# Patient Record
Sex: FEMALE | Race: WHITE | NOT HISPANIC OR LATINO | Employment: PART TIME | URBAN - METROPOLITAN AREA
[De-identification: names, ages, dates, MRNs, and addresses within clinical notes are randomized per-mention and may not be internally consistent; named-entity substitution may affect disease eponyms.]

---

## 2017-06-14 ENCOUNTER — PROCEDURE VISIT (OUTPATIENT)
Dept: OBSTETRICS AND GYNECOLOGY | Facility: CLINIC | Age: 52
End: 2017-06-14

## 2017-06-14 ENCOUNTER — OFFICE VISIT (OUTPATIENT)
Dept: OBSTETRICS AND GYNECOLOGY | Facility: CLINIC | Age: 52
End: 2017-06-14

## 2017-06-14 ENCOUNTER — APPOINTMENT (OUTPATIENT)
Dept: WOMENS IMAGING | Facility: HOSPITAL | Age: 52
End: 2017-06-14

## 2017-06-14 VITALS
HEIGHT: 64 IN | WEIGHT: 146 LBS | DIASTOLIC BLOOD PRESSURE: 82 MMHG | SYSTOLIC BLOOD PRESSURE: 129 MMHG | BODY MASS INDEX: 24.92 KG/M2 | HEART RATE: 83 BPM

## 2017-06-14 DIAGNOSIS — Z01.419 PAP SMEAR, AS PART OF ROUTINE GYNECOLOGICAL EXAMINATION: Primary | ICD-10-CM

## 2017-06-14 DIAGNOSIS — N76.0 ACUTE VAGINITIS: ICD-10-CM

## 2017-06-14 DIAGNOSIS — Z01.419 ENCOUNTER FOR GYNECOLOGICAL EXAMINATION WITHOUT ABNORMAL FINDING: ICD-10-CM

## 2017-06-14 DIAGNOSIS — Z12.11 COLON CANCER SCREENING: ICD-10-CM

## 2017-06-14 DIAGNOSIS — Z12.31 VISIT FOR SCREENING MAMMOGRAM: Primary | ICD-10-CM

## 2017-06-14 LAB — HEMOCCULT STL QL IA: NEGATIVE

## 2017-06-14 PROCEDURE — 99396 PREV VISIT EST AGE 40-64: CPT | Performed by: OBSTETRICS & GYNECOLOGY

## 2017-06-14 PROCEDURE — 82274 ASSAY TEST FOR BLOOD FECAL: CPT | Performed by: OBSTETRICS & GYNECOLOGY

## 2017-06-14 PROCEDURE — 77067 SCR MAMMO BI INCL CAD: CPT | Performed by: RADIOLOGY

## 2017-06-14 RX ORDER — CALCIPOTRIENE 50 UG/G
CREAM TOPICAL
COMMUNITY
Start: 2017-03-15

## 2017-06-14 RX ORDER — ATORVASTATIN CALCIUM 20 MG/1
TABLET, FILM COATED ORAL
COMMUNITY
Start: 2017-03-27

## 2017-06-14 RX ORDER — BETAMETHASONE DIPROPIONATE 0.05 %
OINTMENT (GRAM) TOPICAL
COMMUNITY
Start: 2017-04-07

## 2017-06-14 RX ORDER — OMEPRAZOLE 20 MG/1
20 CAPSULE, DELAYED RELEASE ORAL DAILY
COMMUNITY

## 2017-06-14 NOTE — PROGRESS NOTES
"Subjective   Anh Laura is a 52 y.o. female annual exam. C/o vaginal itching and dryness.    History of Present Illness    Patient is a 52 y.o. for annual exam. Patient complains of mild vaginal itching and dryness for the past year.     Health Maintenance   Topic Date Due   • TDAP/TD VACCINES (1 - Tdap) 03/12/1983   • HEPATITIS C SCREENING  06/14/2017   • LIPID PANEL  06/14/2017   • PAP SMEAR  06/14/2017   • INFLUENZA VACCINE  08/01/2017   • MAMMOGRAM  06/14/2018   • COLONOSCOPY  06/14/2025       The following portions of the patient's history were reviewed and updated as appropriate: allergies, current medications, past family history, past medical history, past social history, past surgical history and problem list.    Review of Systems   Genitourinary: Negative for menstrual problem.        Vaginal itching   All other systems reviewed and are negative.      Vitals:    06/14/17 0933   BP: 129/82   Pulse: 83   Weight: 146 lb (66.2 kg)   Height: 64\" (162.6 cm)       Objective   Physical Exam   Constitutional: She is oriented to person, place, and time. She appears well-developed and well-nourished.   HENT:   Head: Normocephalic and atraumatic.   Eyes: Conjunctivae and EOM are normal.   Neck: Normal range of motion. Neck supple. No thyromegaly present.   Cardiovascular: Normal rate, regular rhythm and normal heart sounds.    Pulmonary/Chest: Effort normal and breath sounds normal. Right breast exhibits no mass, no nipple discharge and no tenderness. Left breast exhibits no mass, no nipple discharge and no tenderness.   Abdominal: Soft. Bowel sounds are normal. There is no hepatosplenomegaly. There is no tenderness. No hernia.   Genitourinary: Rectum normal, vagina normal and uterus normal. Rectal exam shows no external hemorrhoid. There is no rash, tenderness or lesion on the right labia. There is no rash, tenderness or lesion on the left labia. Uterus is not enlarged and not tender. Cervix exhibits no " discharge. Right adnexum displays no mass and no tenderness. Left adnexum displays no mass and no tenderness. No tenderness in the vagina. No vaginal discharge found.   Genitourinary Comments: Mild atrophy noted   Musculoskeletal: Normal range of motion. She exhibits no edema.   Lymphadenopathy:        Right: No inguinal adenopathy present.        Left: No inguinal adenopathy present.   Neurological: She is alert and oriented to person, place, and time.   Skin: Skin is warm and dry. No rash noted.   Psychiatric: She has a normal mood and affect.   Vitals reviewed.        Assessment/Plan   Anh was seen today for gynecologic exam.    Diagnoses and all orders for this visit:    Pap smear, as part of routine gynecological examination  -     IGP, Apt HPV,rfx 16 / 18,45    Acute vaginitis  -     NuSwab VG+    Encounter for gynecological examination without abnormal finding    Personal lubricant for atrophy. Offered hormone cream - patient declines. Breast health discussed. Mammogram today.           Return in about 1 year (around 6/14/2018).

## 2017-06-17 LAB
A VAGINAE DNA VAG QL NAA+PROBE: ABNORMAL SCORE
BVAB2 DNA VAG QL NAA+PROBE: ABNORMAL SCORE
C ALBICANS DNA VAG QL NAA+PROBE: NEGATIVE
C GLABRATA DNA VAG QL NAA+PROBE: NEGATIVE
C TRACH RRNA SPEC QL NAA+PROBE: NEGATIVE
MEGA1 DNA VAG QL NAA+PROBE: ABNORMAL SCORE
N GONORRHOEA RRNA SPEC QL NAA+PROBE: NEGATIVE
T VAGINALIS RRNA SPEC QL NAA+PROBE: NEGATIVE

## 2017-06-19 LAB
CYTOLOGIST CVX/VAG CYTO: NORMAL
CYTOLOGY CVX/VAG DOC THIN PREP: NORMAL
DX ICD CODE: NORMAL
HIV 1 & 2 AB SER-IMP: NORMAL
HPV I/H RISK 4 DNA CVX QL PROBE+SIG AMP: NEGATIVE
Lab: NORMAL
OTHER STN SPEC: NORMAL
PATH REPORT.FINAL DX SPEC: NORMAL
STAT OF ADQ CVX/VAG CYTO-IMP: NORMAL

## 2017-06-19 RX ORDER — METRONIDAZOLE 500 MG/1
500 TABLET ORAL 2 TIMES DAILY
Qty: 14 TABLET | Refills: 0 | Status: SHIPPED | OUTPATIENT
Start: 2017-06-19 | End: 2019-04-30

## 2017-06-20 ENCOUNTER — TELEPHONE (OUTPATIENT)
Dept: OBSTETRICS AND GYNECOLOGY | Facility: CLINIC | Age: 52
End: 2017-06-20

## 2017-06-20 NOTE — TELEPHONE ENCOUNTER
----- Message from Sherrell Ye MA sent at 6/19/2017 11:35 AM EDT -----  June gr pt/bandar  ----- Message -----     From: Linda Thompson MD     Sent: 6/19/2017   9:16 AM       To: Sherrell Ye MA    Please call the patient regarding her abnormal result. Flagyl was sent for bacterial vaginosis.

## 2019-04-30 ENCOUNTER — PROCEDURE VISIT (OUTPATIENT)
Dept: OBSTETRICS AND GYNECOLOGY | Facility: CLINIC | Age: 54
End: 2019-04-30

## 2019-04-30 ENCOUNTER — OFFICE VISIT (OUTPATIENT)
Dept: OBSTETRICS AND GYNECOLOGY | Facility: CLINIC | Age: 54
End: 2019-04-30

## 2019-04-30 ENCOUNTER — APPOINTMENT (OUTPATIENT)
Dept: WOMENS IMAGING | Facility: HOSPITAL | Age: 54
End: 2019-04-30

## 2019-04-30 VITALS
HEART RATE: 98 BPM | HEIGHT: 64 IN | BODY MASS INDEX: 26.63 KG/M2 | SYSTOLIC BLOOD PRESSURE: 122 MMHG | WEIGHT: 156 LBS | DIASTOLIC BLOOD PRESSURE: 82 MMHG

## 2019-04-30 DIAGNOSIS — N76.3 CHRONIC VULVITIS: ICD-10-CM

## 2019-04-30 DIAGNOSIS — Z01.419 VISIT FOR GYNECOLOGIC EXAMINATION: Primary | ICD-10-CM

## 2019-04-30 DIAGNOSIS — Z12.31 VISIT FOR SCREENING MAMMOGRAM: Primary | ICD-10-CM

## 2019-04-30 DIAGNOSIS — Z12.11 COLON CANCER SCREENING: ICD-10-CM

## 2019-04-30 LAB
DEVELOPER EXPIRATION DATE: NORMAL
DEVELOPER LOT NUMBER: NORMAL
EXPIRATION DATE: NORMAL
FECAL OCCULT BLOOD SCREEN, POC: NEGATIVE
Lab: NORMAL
NEGATIVE CONTROL: NEGATIVE
POSITIVE CONTROL: POSITIVE

## 2019-04-30 PROCEDURE — 82274 ASSAY TEST FOR BLOOD FECAL: CPT | Performed by: OBSTETRICS & GYNECOLOGY

## 2019-04-30 PROCEDURE — 77067 SCR MAMMO BI INCL CAD: CPT | Performed by: RADIOLOGY

## 2019-04-30 PROCEDURE — 56605 BIOPSY OF VULVA/PERINEUM: CPT | Performed by: OBSTETRICS & GYNECOLOGY

## 2019-04-30 PROCEDURE — 99396 PREV VISIT EST AGE 40-64: CPT | Performed by: OBSTETRICS & GYNECOLOGY

## 2019-04-30 PROCEDURE — 77067 SCR MAMMO BI INCL CAD: CPT | Performed by: OBSTETRICS & GYNECOLOGY

## 2019-04-30 NOTE — PROGRESS NOTES
"[facility]  [address], [address]  [place], [place] [address]  Phone: [phone] / Fax:  [phone]      2019    [address] [place] [address] [place] [id number]    Brendan Jean MD    Chief Complaint   Patient presents with   • Gynecologic Exam     Annual Exam, last pap  6-14-17 Nl Hpv (-), mammogram today, colonoscopy 3 years nl per patient.       Anh Laura is here for annual gynecologic exam.  HPI - Patient is a 54 year old female who had last pap/annual 2 years ago.  Colonoscopy up to date and normal.  Mammogram was done today.  Patient reports chronic vulvar itching and irritation.  She has a history of psoriasis and states she has tried to put her topical medications on the areas that bother her with limited success.  She has not had a work up.    Past Medical History:   Diagnosis Date   • Hyperlipidemia        Past Surgical History:   Procedure Laterality Date   • SMALL INTESTINE SURGERY         No Known Allergies    Social History     Socioeconomic History   • Marital status: Single     Spouse name: Not on file   • Number of children: Not on file   • Years of education: Not on file   • Highest education level: Not on file   Tobacco Use   • Smoking status: Never Smoker   • Smokeless tobacco: Never Used   Substance and Sexual Activity   • Alcohol use: Yes     Comment: rarely   • Drug use: No   • Sexual activity: Yes     Partners: Male     Birth control/protection: Post-menopausal       Family History   Problem Relation Age of Onset   • Heart disease Father        No LMP recorded. Patient is postmenopausal.    OB History      Para Term  AB Living    0 0 0 0 0 0    SAB TAB Ectopic Molar Multiple Live Births    0 0 0   0            Vitals:    19 1419   BP: 122/82   Pulse: 98   Weight: 70.8 kg (156 lb)   Height: 162.6 cm (64\")       Physical Exam   Constitutional: She appears well-developed and well-nourished.   Genitourinary: Rectum normal, vagina normal and " uterus normal. Pelvic exam was performed with patient supine. No labial fusion.   There is rash on the right labia. There is no tenderness, injury or Bartholin's cyst on the right labia.   There is rash on the left labia. There is no tenderness, injury or Bartholin's cyst on the left labia. Right adnexum does not display tenderness and does not display fullness. Left adnexum does not display tenderness and does not display fullness. Cervix does not exhibit motion tenderness or lesion. Rectal exam shows no external hemorrhoid, no tenderness and guaiac negative stool.   HENT:   Right Ear: External ear normal.   Left Ear: External ear normal.   Nose: Nose normal.   Eyes: Conjunctivae are normal.   Neck: Normal range of motion. Neck supple. No thyromegaly present.   Cardiovascular: Normal rate, regular rhythm and normal heart sounds.   Pulmonary/Chest: Effort normal. No stridor. She has no wheezes. Right breast exhibits no mass and no nipple discharge. Left breast exhibits no mass and no nipple discharge.   Abdominal: There is no tenderness. There is no guarding.   Musculoskeletal: Normal range of motion. She exhibits no edema.   Neurological: She is alert. Coordination normal.   Skin: Skin is warm and dry.   Psychiatric: She has a normal mood and affect. Her behavior is normal. Judgment and thought content normal.   Vitals reviewed.    PROCEDURE: Biopsy of vulva. Lesion displays intense itching and plaque-like thickening.  Location: vulva  Size: 3 mm punch biopsy    Informed Consent: The indications, risks, benefits and alternatives to the procedure, including no procedure, were discussed in detail.  Risks of the procedure were described and verbal consent was obtained.    The surgical site was prepped with betadine.  Sterile technique was used throughout the procedure.   Anesthesia was obtained using 1% lidocaine.      A biopsy was obtained from the lesion using punch technique of the right introitus.  The surgical  site was closed using silver nitrate.    The procedure was well tolerated without complications.  Blood loss was minimal. The specimen was submitted to pathology. The patient was educated about signs of infection, and wound care instructions were reviewed.  The patient will be contacted in 7-10 days with the biopsy results and a follow up plan will be discussed at that time.    Anh was seen today for gynecologic exam.    Diagnoses and all orders for this visit:    Visit for gynecologic examination        -     Discussed importance of routine screening and breast awareness.  Mammogram and colonoscopy up to date.  Chronic vulvitis  -     Reference Histopathology  -     Biopsy Vulva  -     Await results.    Colon cancer screening        -     FOBT negative.  Screening up to date.      Louie Chiang MD

## 2019-05-03 LAB
DX ICD CODE: NORMAL
PATH REPORT.FINAL DX SPEC: NORMAL
PATH REPORT.GROSS SPEC: NORMAL
PATH REPORT.SITE OF ORIGIN SPEC: NORMAL
PATHOLOGIST NAME: NORMAL
PAYMENT PROCEDURE: NORMAL

## 2019-05-06 ENCOUNTER — TELEPHONE (OUTPATIENT)
Dept: OBSTETRICS AND GYNECOLOGY | Facility: CLINIC | Age: 54
End: 2019-05-06

## 2019-05-06 RX ORDER — CLOBETASOL PROPIONATE 0.5 MG/G
CREAM TOPICAL 2 TIMES DAILY
Qty: 60 G | Refills: 1 | Status: SHIPPED | OUTPATIENT
Start: 2019-05-06

## 2025-03-17 NOTE — PROGRESS NOTES
Chief Complaint  Chief Complaint   Patient presents with    Establish Care     New Patient     Gout     Gout causing ankle pain     Hypertension        Subjective          Anh Laura is here today to establish care. The following problems were discussed:     Family history: Father- HTN. Brother- HTN, CVA, blood clot     Social history: Denies smoking, drinks 5 beers 3 times a week, denies illegal drug use.     HLD- She tries to watch what she eats. She was previously on HLD medication, but has not been on any for a while.     GERD- She takes omeprazole. Controlled on medication.     Psoriasis- She puts on cream everyday.     Overweight- She exercises 5 times a week. She tries to eat healthy, not enough healthy food.     Left foot pain- She reports this started last Monday. She denies any injuries. Her brother and father has gout. She reports Sunday she had trouble walking on it. She has been taking two aleve and tylenol every 5 hours.     She is from this area   Previous PCP was Laurence Galeas   Marital status- not   Children- No  Works as [employer]   Exercise- regularly 5 times weekly  Diet- Not eating enough healthy food         Review of Systems   Constitutional:  Negative for chills and fever.   HENT:  Negative for congestion.    Respiratory:  Negative for shortness of breath.    Cardiovascular:  Negative for chest pain.   Gastrointestinal:  Positive for nausea. Negative for abdominal pain and vomiting.   Genitourinary:  Negative for difficulty urinating.   Musculoskeletal:  Negative for myalgias.        Left foot pain    Skin: Negative.    Neurological:  Negative for dizziness, light-headedness and headache.   Psychiatric/Behavioral:  Negative for self-injury, suicidal ideas and depressed mood. The patient is not nervous/anxious.         Objective   Vital Signs:   Vitals:    03/19/25 1320   BP: 122/75   Pulse: 91   Resp: 14   Temp: 98 °F (36.7 °C)   SpO2: 100%      Estimated body mass index  "is 29.7 kg/m² as calculated from the following:    Height as of this encounter: 162.6 cm (64\").    Weight as of this encounter: 78.5 kg (173 lb).    BMI is >= 25 and <30. (Overweight) The following options were offered after discussion;: exercise counseling/recommendations and nutrition counseling/recommendations            Patient screened negative for depression based on a PHQ-9 score of 0 on 3/19/2025 . Follow-up recommendations include: PCP managing depression.        Physical Exam  Vitals reviewed.   Constitutional:       Appearance: Normal appearance.   HENT:      Head: Normocephalic and atraumatic.      Nose: Nose normal.      Mouth/Throat:      Mouth: Mucous membranes are moist.      Pharynx: Oropharynx is clear.   Eyes:      Extraocular Movements: Extraocular movements intact.      Conjunctiva/sclera: Conjunctivae normal.      Pupils: Pupils are equal, round, and reactive to light.   Cardiovascular:      Rate and Rhythm: Normal rate and regular rhythm.      Pulses: Normal pulses.      Heart sounds: Normal heart sounds.      Comments: S1, S2 audible  Pulmonary:      Effort: Pulmonary effort is normal.      Breath sounds: Normal breath sounds.      Comments: On room air   Abdominal:      General: Abdomen is flat. Bowel sounds are normal.      Palpations: Abdomen is soft.   Musculoskeletal:         General: Normal range of motion.      Cervical back: Normal range of motion.   Skin:     General: Skin is warm and dry.   Neurological:      General: No focal deficit present.      Mental Status: She is alert and oriented to person, place, and time. Mental status is at baseline.   Psychiatric:         Mood and Affect: Mood normal.         Behavior: Behavior normal.         Thought Content: Thought content normal.         Judgment: Judgment normal.                Physical Exam   Result Review :             Procedures       Assessment and Plan     Diagnoses and all orders for this visit:    1. Encounter to establish " care (Primary)  Assessment & Plan:  She is from this area   Previous PCP was Laurence Galeas   Marital status- not   Children- No  Works as [employer]   Exercise- regularly 5 times weekly  Diet- Not eating enough healthy food    Family history: Father- HTN. Brother- HTN, CVA, blood clot     Social history: Denies smoking, drinks 5 beers 3 times a week, denies illegal drug use.       2. Hyperlipidemia, unspecified hyperlipidemia type  Assessment & Plan:   HLD- She tries to watch what she eats. She was previously on HLD medication, but has not been on any for a while.     Check lipid panel     Orders:  -     Lipid panel    3. Gastroesophageal reflux disease without esophagitis  Assessment & Plan:  GERD- She takes omeprazole. Controlled on medication.       4. Psoriasis  Assessment & Plan:  Psoriasis- She puts on cream everyday.       5. Overweight (BMI 25.0-29.9)  Assessment & Plan:  Patient's (Body mass index is 29.7 kg/m².)       Overweight- She exercises 5 times a week. She tries to eat healthy, not enough healthy food.     Encourage healthy diet and exercise      6. Colon cancer screening  -     Ambulatory Referral For Screening Colonoscopy    7. Left foot pain  Assessment & Plan:    Left foot pain- She reports this started last Monday. She denies any injuries. Her brother and father has gout. She reports Sunday she had trouble walking on it. She has been taking two aleve and tylenol every 5 hours.     Gout-A void vitamin C, cherries/extract, avoid alcohol and red meat. Increase exercise and work on eating a well-balanced diet.  Limit white foods (pasta, bread, rice, potatoes), saturated fats, sugary drinks, and dark meats.        Check uric acid and CBC    Orders:  -     Uric acid  -     CBC & Differential              Follow Up   Return for Annual physical.   Patient was given instructions and counseling regarding her condition or for health maintenance advice. Please see specific information pulled  into the AVS if appropriate.

## 2025-03-19 ENCOUNTER — OFFICE VISIT (OUTPATIENT)
Dept: FAMILY MEDICINE CLINIC | Facility: CLINIC | Age: 60
End: 2025-03-19
Payer: COMMERCIAL

## 2025-03-19 VITALS
RESPIRATION RATE: 14 BRPM | HEART RATE: 91 BPM | SYSTOLIC BLOOD PRESSURE: 122 MMHG | BODY MASS INDEX: 29.53 KG/M2 | OXYGEN SATURATION: 100 % | HEIGHT: 64 IN | TEMPERATURE: 98 F | WEIGHT: 173 LBS | DIASTOLIC BLOOD PRESSURE: 75 MMHG

## 2025-03-19 DIAGNOSIS — E78.5 HYPERLIPIDEMIA, UNSPECIFIED HYPERLIPIDEMIA TYPE: ICD-10-CM

## 2025-03-19 DIAGNOSIS — Z12.11 COLON CANCER SCREENING: ICD-10-CM

## 2025-03-19 DIAGNOSIS — M79.672 LEFT FOOT PAIN: ICD-10-CM

## 2025-03-19 DIAGNOSIS — Z76.89 ENCOUNTER TO ESTABLISH CARE: Primary | ICD-10-CM

## 2025-03-19 DIAGNOSIS — E66.3 OVERWEIGHT (BMI 25.0-29.9): ICD-10-CM

## 2025-03-19 DIAGNOSIS — K21.9 GASTROESOPHAGEAL REFLUX DISEASE WITHOUT ESOPHAGITIS: ICD-10-CM

## 2025-03-19 DIAGNOSIS — L40.9 PSORIASIS: ICD-10-CM

## 2025-03-19 PROBLEM — Z01.419 ENCOUNTER FOR GYNECOLOGICAL EXAMINATION WITHOUT ABNORMAL FINDING: Status: RESOLVED | Noted: 2017-06-14 | Resolved: 2025-03-19

## 2025-03-19 NOTE — ASSESSMENT & PLAN NOTE
She is from this area   Previous PCP was Laurence Galeas   Marital status- not   Children- No  Works as [employer]   Exercise- regularly 5 times weekly  Diet- Not eating enough healthy food    Family history: Father- HTN. Brother- HTN, CVA, blood clot     Social history: Denies smoking, drinks 5 beers 3 times a week, denies illegal drug use.

## 2025-03-19 NOTE — PATIENT INSTRUCTIONS
Gout-A void vitamin C, cherries/extract, avoid alcohol and red meat. Increase exercise and work on eating a well-balanced diet.  Limit white foods (pasta, bread, rice, potatoes), saturated fats, sugary drinks, and dark meats.    Check labs   Referral to gastroenterology

## 2025-03-19 NOTE — ASSESSMENT & PLAN NOTE
HLD- She tries to watch what she eats. She was previously on HLD medication, but has not been on any for a while.     Check lipid panel

## 2025-03-19 NOTE — ASSESSMENT & PLAN NOTE
Patient's (Body mass index is 29.7 kg/m².)       Overweight- She exercises 5 times a week. She tries to eat healthy, not enough healthy food.     Encourage healthy diet and exercise

## 2025-03-19 NOTE — ASSESSMENT & PLAN NOTE
Left foot pain- She reports this started last Monday. She denies any injuries. Her brother and father has gout. She reports Sunday she had trouble walking on it. She has been taking two aleve and tylenol every 5 hours.     Gout-A void vitamin C, cherries/extract, avoid alcohol and red meat. Increase exercise and work on eating a well-balanced diet.  Limit white foods (pasta, bread, rice, potatoes), saturated fats, sugary drinks, and dark meats.        Check uric acid and CBC

## 2025-03-19 NOTE — PROGRESS NOTES
Venipuncture performed in L ARM by RT Patrice  with good hemostasis. Patient tolerated well. 03/19/25 Cadence Beth, RT

## 2025-03-25 ENCOUNTER — CLINICAL SUPPORT (OUTPATIENT)
Dept: FAMILY MEDICINE CLINIC | Facility: CLINIC | Age: 60
End: 2025-03-25
Payer: COMMERCIAL

## 2025-03-25 DIAGNOSIS — M79.672 LEFT FOOT PAIN: ICD-10-CM

## 2025-03-25 DIAGNOSIS — E78.5 HYPERLIPIDEMIA, UNSPECIFIED HYPERLIPIDEMIA TYPE: ICD-10-CM

## 2025-03-25 DIAGNOSIS — Z76.89 ENCOUNTER TO ESTABLISH CARE: ICD-10-CM

## 2025-03-25 DIAGNOSIS — L40.9 PSORIASIS: ICD-10-CM

## 2025-03-25 PROCEDURE — 80061 LIPID PANEL: CPT | Performed by: NURSE PRACTITIONER

## 2025-03-25 PROCEDURE — 85025 COMPLETE CBC W/AUTO DIFF WBC: CPT | Performed by: NURSE PRACTITIONER

## 2025-03-25 PROCEDURE — 84550 ASSAY OF BLOOD/URIC ACID: CPT | Performed by: NURSE PRACTITIONER

## 2025-03-25 NOTE — PROGRESS NOTES
Venipuncture performed in right arm by Vidya Loyola CMA  with good hemostasis. Patient tolerated well. 03/25/25 Vidya Loyola CMA

## 2025-03-26 PROBLEM — M10.9 GOUT: Status: ACTIVE | Noted: 2025-03-26

## 2025-03-26 LAB
BASOPHILS # BLD AUTO: 0.05 10*3/MM3 (ref 0–0.2)
BASOPHILS NFR BLD AUTO: 0.5 % (ref 0–1.5)
CHOLEST SERPL-MCNC: 256 MG/DL (ref 0–200)
DEPRECATED RDW RBC AUTO: 37.2 FL (ref 37–54)
EOSINOPHIL # BLD AUTO: 0.15 10*3/MM3 (ref 0–0.4)
EOSINOPHIL NFR BLD AUTO: 1.4 % (ref 0.3–6.2)
ERYTHROCYTE [DISTWIDTH] IN BLOOD BY AUTOMATED COUNT: 11.8 % (ref 12.3–15.4)
HCT VFR BLD AUTO: 38.6 % (ref 34–46.6)
HDLC SERPL-MCNC: 57 MG/DL (ref 40–60)
HGB BLD-MCNC: 13.1 G/DL (ref 12–15.9)
IMM GRANULOCYTES # BLD AUTO: 0.03 10*3/MM3 (ref 0–0.05)
IMM GRANULOCYTES NFR BLD AUTO: 0.3 % (ref 0–0.5)
LDLC SERPL CALC-MCNC: 160 MG/DL (ref 0–100)
LDLC/HDLC SERPL: 2.75 {RATIO}
LYMPHOCYTES # BLD AUTO: 3.98 10*3/MM3 (ref 0.7–3.1)
LYMPHOCYTES NFR BLD AUTO: 36.4 % (ref 19.6–45.3)
MCH RBC QN AUTO: 29.4 PG (ref 26.6–33)
MCHC RBC AUTO-ENTMCNC: 33.9 G/DL (ref 31.5–35.7)
MCV RBC AUTO: 86.5 FL (ref 79–97)
MONOCYTES # BLD AUTO: 0.83 10*3/MM3 (ref 0.1–0.9)
MONOCYTES NFR BLD AUTO: 7.6 % (ref 5–12)
NEUTROPHILS NFR BLD AUTO: 5.89 10*3/MM3 (ref 1.7–7)
NEUTROPHILS NFR BLD AUTO: 53.8 % (ref 42.7–76)
NRBC BLD AUTO-RTO: 0 /100 WBC (ref 0–0.2)
PLATELET # BLD AUTO: 271 10*3/MM3 (ref 140–450)
PMV BLD AUTO: 10.7 FL (ref 6–12)
RBC # BLD AUTO: 4.46 10*6/MM3 (ref 3.77–5.28)
TRIGL SERPL-MCNC: 211 MG/DL (ref 0–150)
URATE SERPL-MCNC: 7.6 MG/DL (ref 2.4–5.7)
VLDLC SERPL-MCNC: 39 MG/DL (ref 5–40)
WBC NRBC COR # BLD AUTO: 10.93 10*3/MM3 (ref 3.4–10.8)

## 2025-06-09 ENCOUNTER — PATIENT MESSAGE (OUTPATIENT)
Dept: FAMILY MEDICINE CLINIC | Facility: CLINIC | Age: 60
End: 2025-06-09
Payer: COMMERCIAL

## 2025-06-11 NOTE — PROGRESS NOTES
"Chief Complaint  Chief Complaint   Patient presents with    Annual Exam        Subjective          Anh Laura is a 60-year-old female who reports to the office today for annual physical.    Annual physical-Denies any new family history. Family history: Father- HTN. Brother- HTN, CVA, blood clot Social history: Denies smoking, drinks 5 beers 3 times a week, denies illegal drug use.      HLD- She tries to watch what she eats. She is compliant on statin.     GERD- She takes omeprazole. Controlled on medication.      Psoriasis- She puts on cream as needed with flare-ups.      Overweight- She exercises 5 times a week. She tries to eat healthy.    Gout- She took the allopurinol and reports it got better.     Labs-Due   Colonoscopy-Due  Mammogram-Has scheduled for 7/3/2025   Papsmear-Has on July 3,2025       Vaccines:  Shingles-Due       Dental exam-UTD  Eye exam-UTD          Review of Systems   Constitutional:  Negative for chills and fever.   HENT:  Negative for congestion.    Eyes: Negative.    Respiratory:  Negative for shortness of breath.    Cardiovascular:  Negative for chest pain.   Gastrointestinal:  Negative for abdominal pain, nausea and vomiting.   Genitourinary:  Negative for difficulty urinating.   Musculoskeletal:  Negative for myalgias.   Skin: Negative.    Neurological:  Negative for dizziness, light-headedness and headache.   Psychiatric/Behavioral:  Negative for self-injury, suicidal ideas and depressed mood. The patient is not nervous/anxious.         Objective   Vital Signs:   Vitals:    06/17/25 1415   BP: 138/86   Pulse: 85   Temp: 98.2 °F (36.8 °C)   SpO2: 99%      Estimated body mass index is 30.02 kg/m² as calculated from the following:    Height as of this encounter: 162.6 cm (64.02\").    Weight as of this encounter: 79.4 kg (175 lb).                  Patient screened negative for depression based on a PHQ-9 score of 0  on 6/17/2025. Follow-up recommendations include: PCP managing " depression.        Physical Exam  Vitals reviewed.   Constitutional:       Appearance: Normal appearance. She is obese.   HENT:      Head: Normocephalic and atraumatic.      Right Ear: Tympanic membrane, ear canal and external ear normal.      Left Ear: Tympanic membrane, ear canal and external ear normal.      Nose: Nose normal.      Mouth/Throat:      Mouth: Mucous membranes are moist.      Pharynx: Oropharynx is clear.   Eyes:      Extraocular Movements: Extraocular movements intact.      Conjunctiva/sclera: Conjunctivae normal.      Pupils: Pupils are equal, round, and reactive to light.   Cardiovascular:      Rate and Rhythm: Normal rate and regular rhythm.      Pulses: Normal pulses.      Heart sounds: Normal heart sounds.      Comments: S1, S2 audible  Pulmonary:      Effort: Pulmonary effort is normal.      Breath sounds: Normal breath sounds.      Comments: On room air   Abdominal:      General: Abdomen is flat. Bowel sounds are normal.      Palpations: Abdomen is soft.   Musculoskeletal:         General: Normal range of motion.      Cervical back: Normal range of motion and neck supple.   Skin:     General: Skin is warm and dry.   Neurological:      General: No focal deficit present.      Mental Status: She is alert and oriented to person, place, and time. Mental status is at baseline.   Psychiatric:         Mood and Affect: Mood normal.         Behavior: Behavior normal.         Thought Content: Thought content normal.         Judgment: Judgment normal.                Physical Exam   Result Review :             Procedures       Assessment and Plan     Diagnoses and all orders for this visit:    1. Hyperlipidemia, unspecified hyperlipidemia type (Primary)  Assessment & Plan:  Check lipid panel  On statin     HLD- She tries to watch what she eats. She is compliant on statin.      2. Psoriasis  Assessment & Plan:  On clobetasol cream    Psoriasis- She puts on cream as needed with flare-ups.       3.  Gastroesophageal reflux disease without esophagitis  Assessment & Plan:  GERD- She takes omeprazole. Controlled on medication.       4. Gout, unspecified cause, unspecified chronicity, unspecified site  Assessment & Plan:  On allopurinol     Gout- She took the allopurinol and reports it got better.       5. Annual physical exam  Assessment & Plan:  Annual physical-Denies any new family history. Family history: Father- HTN. Brother- HTN, CVA, blood clot Social history: Denies smoking, drinks 5 beers 3 times a week, denies illegal drug use.     Labs-Due   Colonoscopy-Due  Mammogram-Has scheduled for 7/3/2025   Papsmear-Has on July 3,2025       Vaccines:  Shingles-Due       Dental exam-UTD  Eye exam-UTD     Encourage healthy diet and exercise   Encouraged monthly self breast exams  Check labs  Call about colonoscopy  Encourage shingles vaccine  Mammogram and papsmear scheduled already     Orders:  -     Comprehensive Metabolic Panel; Future  -     Hemoglobin A1c; Future  -     Lipid Panel; Future    6. Overweight (BMI 25.0-29.9)  Assessment & Plan:  Patient's (Body mass index is 30.02 kg/m².)     Overweight- She exercises 5 times a week. She tries to eat healthy.    Encourage healthy diet and exercise       7. Obesity (BMI 30-39.9)  Assessment & Plan:  Patient's (Body mass index is 30.02 kg/m².)        Overweight- She exercises 5 times a week. She tries to eat healthy.     Encourage healthy diet and exercise       Other orders  -     allopurinol (Zyloprim) 100 MG tablet; Take 1 tablet by mouth Daily.  Dispense: 90 tablet; Refill: 1              Follow Up   Return if symptoms worsen or fail to improve.   Patient was given instructions and counseling regarding her condition or for health maintenance advice. Please see specific information pulled into the AVS if appropriate.

## 2025-06-16 NOTE — ASSESSMENT & PLAN NOTE
Check lipid panel  On statin     HLD- She tries to watch what she eats. She is compliant on statin.

## 2025-06-17 ENCOUNTER — OFFICE VISIT (OUTPATIENT)
Dept: FAMILY MEDICINE CLINIC | Facility: CLINIC | Age: 60
End: 2025-06-17
Payer: COMMERCIAL

## 2025-06-17 VITALS
HEIGHT: 64 IN | TEMPERATURE: 98.2 F | BODY MASS INDEX: 29.88 KG/M2 | DIASTOLIC BLOOD PRESSURE: 86 MMHG | SYSTOLIC BLOOD PRESSURE: 138 MMHG | HEART RATE: 85 BPM | OXYGEN SATURATION: 99 % | WEIGHT: 175 LBS

## 2025-06-17 DIAGNOSIS — E78.5 HYPERLIPIDEMIA, UNSPECIFIED HYPERLIPIDEMIA TYPE: Primary | ICD-10-CM

## 2025-06-17 DIAGNOSIS — E66.9 OBESITY (BMI 30-39.9): ICD-10-CM

## 2025-06-17 DIAGNOSIS — L40.9 PSORIASIS: ICD-10-CM

## 2025-06-17 DIAGNOSIS — Z00.00 ANNUAL PHYSICAL EXAM: ICD-10-CM

## 2025-06-17 DIAGNOSIS — E66.3 OVERWEIGHT (BMI 25.0-29.9): ICD-10-CM

## 2025-06-17 DIAGNOSIS — M10.9 GOUT, UNSPECIFIED CAUSE, UNSPECIFIED CHRONICITY, UNSPECIFIED SITE: ICD-10-CM

## 2025-06-17 DIAGNOSIS — K21.9 GASTROESOPHAGEAL REFLUX DISEASE WITHOUT ESOPHAGITIS: ICD-10-CM

## 2025-06-17 PROBLEM — Z76.89 ENCOUNTER TO ESTABLISH CARE: Status: RESOLVED | Noted: 2025-03-19 | Resolved: 2025-06-17

## 2025-06-17 PROBLEM — M79.672 LEFT FOOT PAIN: Status: RESOLVED | Noted: 2025-03-19 | Resolved: 2025-06-17

## 2025-06-17 RX ORDER — ALLOPURINOL 100 MG/1
100 TABLET ORAL DAILY
Qty: 90 TABLET | Refills: 1 | Status: SHIPPED | OUTPATIENT
Start: 2025-06-17

## 2025-06-17 NOTE — ASSESSMENT & PLAN NOTE
Annual physical-Denies any new family history. Family history: Father- HTN. Brother- HTN, CVA, blood clot Social history: Denies smoking, drinks 5 beers 3 times a week, denies illegal drug use.     Labs-Due   Colonoscopy-Due  Mammogram-Has scheduled for 7/3/2025   Papsmear-Has on July 3,2025       Vaccines:  Shingles-Due       Dental exam-UTD  Eye exam-UTD     Encourage healthy diet and exercise   Encouraged monthly self breast exams  Check labs  Call about colonoscopy  Encourage shingles vaccine  Mammogram and papsmear scheduled already

## 2025-06-17 NOTE — ASSESSMENT & PLAN NOTE
Patient's (Body mass index is 30.02 kg/m².)        Overweight- She exercises 5 times a week. She tries to eat healthy.     Encourage healthy diet and exercise

## 2025-06-17 NOTE — PATIENT INSTRUCTIONS
Encourage healthy diet and exercise   Encouraged monthly self breast exams  Check labs  Call about colonoscopy  Encourage shingles vaccine  Mammogram and papsmear scheduled already

## 2025-07-03 ENCOUNTER — PROCEDURE VISIT (OUTPATIENT)
Dept: OBSTETRICS AND GYNECOLOGY | Facility: CLINIC | Age: 60
End: 2025-07-03
Payer: COMMERCIAL

## 2025-07-03 ENCOUNTER — TELEPHONE (OUTPATIENT)
Dept: OBSTETRICS AND GYNECOLOGY | Facility: CLINIC | Age: 60
End: 2025-07-03
Payer: COMMERCIAL

## 2025-07-03 ENCOUNTER — OFFICE VISIT (OUTPATIENT)
Dept: OBSTETRICS AND GYNECOLOGY | Facility: CLINIC | Age: 60
End: 2025-07-03
Payer: COMMERCIAL

## 2025-07-03 VITALS
BODY MASS INDEX: 29.53 KG/M2 | WEIGHT: 173 LBS | DIASTOLIC BLOOD PRESSURE: 84 MMHG | HEIGHT: 64 IN | SYSTOLIC BLOOD PRESSURE: 139 MMHG

## 2025-07-03 DIAGNOSIS — Z12.31 VISIT FOR SCREENING MAMMOGRAM: Primary | ICD-10-CM

## 2025-07-03 DIAGNOSIS — N90.4 LICHEN SCLEROSUS OF FEMALE GENITALIA: ICD-10-CM

## 2025-07-03 DIAGNOSIS — Z13.820 SCREENING FOR OSTEOPOROSIS: ICD-10-CM

## 2025-07-03 DIAGNOSIS — Z01.419 WOMEN'S ANNUAL ROUTINE GYNECOLOGICAL EXAMINATION: Primary | ICD-10-CM

## 2025-07-03 DIAGNOSIS — Z78.0 POSTMENOPAUSE: ICD-10-CM

## 2025-07-03 DIAGNOSIS — Z12.11 SCREENING FOR COLON CANCER: ICD-10-CM

## 2025-07-03 RX ORDER — CLOBETASOL PROPIONATE 0.5 MG/G
CREAM TOPICAL 2 TIMES DAILY
Qty: 60 G | Refills: 1 | Status: SHIPPED | OUTPATIENT
Start: 2025-07-03

## 2025-07-03 NOTE — PROGRESS NOTES
GYN Annual Exam     Chief Complaint   Patient presents with    Gynecologic Exam     Pt here today for AE, Mammo today      Anh Laura is a 60 y.o. female who presents for annual well woman exam. She is postmenopausal. She denies vaginal spotting or discharge. She completes SBE monthly. Hx vulvar itching, prior biopsy proven lichen sclerosus. Hx psoriasis. Vulvar itching improved with clobetasol. Reports intermittent itching. She is a pt of Dr. Chiang's.  Last seen in 2019. This is my first time meeting Anh Laura     OB History          0    Para   0    Term   0       0    AB   0    Living   0         SAB   0    IAB   0    Ectopic   0    Molar        Multiple   0    Live Births                  Mammogram: today  Dexa scan: never   Colonoscopy:   Last Pap :  NIL, HPV negative  History of abnormal Pap smear: yes, reports prior cervical procedure, she is unsure if dysplasia was present  Family history of uterine, colon or ovarian cancer: no  Family history of breast cancer: no  History of abnormal mammogram: no    Menopause:  Bleeding since? no  Vasomotor symptoms: no  HRT: no  Dyspareunia: yes, dryness. Improved with lubricants.     Past Medical History:   Diagnosis Date    Abnormal Pap smear of cervix ?    Don't remember    Colon polyp     I think they said i had some    GERD (gastroesophageal reflux disease)     Take omneprazole every to every other day    Gout     Hyperlipidemia     Varicella     I wasa kid       Past Surgical History:   Procedure Laterality Date    COLONOSCOPY      SMALL INTESTINE SURGERY      WISDOM TOOTH EXTRACTION      I was 18         Current Outpatient Medications:     allopurinol (Zyloprim) 100 MG tablet, Take 1 tablet by mouth Daily., Disp: 90 tablet, Rfl: 1    atorvastatin (Lipitor) 20 MG tablet, Take 1 tablet by mouth Daily., Disp: 90 tablet, Rfl: 1    calcipotriene (DOVONEX) 0.005 % cream, , Disp: , Rfl:     clobetasol propionate  "(Temovate) 0.05 % cream, Apply  topically to the appropriate area as directed 2 (Two) Times a Day., Disp: 60 g, Rfl: 1    multivitamin with minerals (MULTIVITAMIN ADULTS PO), Take 1 tablet by mouth Daily. URIC ACID, Disp: , Rfl:     omeprazole (priLOSEC) 20 MG capsule, Take 1 capsule by mouth Daily., Disp: , Rfl:     Probiotic Product (PROBIOTIC BLEND PO), Take 1 tablet by mouth Daily., Disp: , Rfl:     No Known Allergies    Social History     Tobacco Use    Smoking status: Former     Current packs/day: 0.00     Types: Cigarettes     Start date: 1985     Quit date: 1997     Years since quittin.5     Passive exposure: Past    Smokeless tobacco: Never   Vaping Use    Vaping status: Never Used   Substance Use Topics    Alcohol use: Yes     Alcohol/week: 10.0 standard drinks of alcohol     Types: 10 Cans of beer per week     Comment: rarely    Drug use: No       Family History   Problem Relation Age of Onset    Heart disease Father     Hyperlipidemia Father     Stroke Brother        Review of Systems   Constitutional:  Negative for chills, fatigue and fever.   Gastrointestinal:  Negative for abdominal distention, abdominal pain, nausea and vomiting.   Endocrine: Negative for cold intolerance and heat intolerance.   Genitourinary:  Negative for dyspareunia, dysuria, menstrual problem, pelvic pain, vaginal bleeding, vaginal discharge and vaginal pain.        + vulvar itching   Musculoskeletal:  Negative for gait problem.   Skin:  Negative for rash.   Neurological:  Negative for dizziness and headaches.   Hematological:  Does not bruise/bleed easily.   Psychiatric/Behavioral:  Negative for behavioral problems.        /84   Ht 162.6 cm (64.02\")   Wt 78.5 kg (173 lb)   BMI 29.68 kg/m²     Physical Exam  Constitutional:       General: She is not in acute distress.     Appearance: Normal appearance. She is not ill-appearing, toxic-appearing or diaphoretic.   Genitourinary:      Vulva, bladder and urethral " meatus normal.      No lesions in the vagina.      Right Labia: skin changes.      Right Labia: No rash, tenderness, lesions or Bartholin's cyst.     Left Labia: skin changes.      Left Labia: No tenderness, lesions, Bartholin's cyst or rash.     No labial fusion noted.      Vulva exam comments: Hypopigmentation of bilateral labia majora and perineum. Linear fissure at perineum .      No inguinal adenopathy present in the right or left side.     No vaginal discharge, erythema, tenderness, bleeding or ulceration.      No vaginal prolapse present.     Moderate vaginal atrophy present.       Right Adnexa: not tender, not full, not palpable, no mass present and not absent.     Left Adnexa: not tender, not full, not palpable, no mass present and not absent.     No cervical motion tenderness, discharge, friability, lesion, polyp, nabothian cyst or eversion.      Uterus is not enlarged, fixed, tender, irregular or prolapsed.      No uterine mass detected.     No urethral tenderness or mass present.      Pelvic exam was performed with patient in the lithotomy position.   Breasts:     Breasts are symmetrical.      Right: Present. No swelling, bleeding, inverted nipple, mass, nipple discharge, skin change, tenderness or breast implant.      Left: Present. No swelling, bleeding, inverted nipple, mass, nipple discharge, skin change, tenderness or breast implant.   HENT:      Head: Normocephalic and atraumatic.   Eyes:      Pupils: Pupils are equal, round, and reactive to light.   Cardiovascular:      Rate and Rhythm: Normal rate.   Pulmonary:      Effort: Pulmonary effort is normal.   Abdominal:      General: There is no distension.      Palpations: Abdomen is soft. There is no mass.      Tenderness: There is no abdominal tenderness. There is no guarding.      Hernia: No hernia is present. There is no hernia in the left inguinal area or right inguinal area.   Musculoskeletal:         General: Normal range of motion.       Cervical back: Normal range of motion and neck supple. No tenderness.   Lymphadenopathy:      Cervical: No cervical adenopathy.      Upper Body:      Right upper body: No supraclavicular, axillary or pectoral adenopathy.      Left upper body: No supraclavicular, axillary or pectoral adenopathy.      Lower Body: No right inguinal adenopathy. No left inguinal adenopathy.   Neurological:      General: No focal deficit present.      Mental Status: She is alert and oriented to person, place, and time.      Cranial Nerves: No cranial nerve deficit.   Skin:     General: Skin is warm and dry.   Psychiatric:         Mood and Affect: Mood normal.         Behavior: Behavior normal.         Thought Content: Thought content normal.         Judgment: Judgment normal.   Vitals and nursing note reviewed.     Assessment    Diagnoses and all orders for this visit:    1. Women's annual routine gynecological examination (Primary)  -     IGP, Apt HPV,rfx 16 / 18,45    2. Postmenopause  -     DEXA Bone Density Axial; Future    3. Screening for colon cancer  -     Ambulatory Referral For Screening Colonoscopy    4. Screening for osteoporosis  -     DEXA Bone Density Axial; Future    5. Lichen sclerosus of female genitalia  -     clobetasol propionate (Temovate) 0.05 % cream; Apply  topically to the appropriate area as directed 2 (Two) Times a Day.  Dispense: 60 g; Refill: 1       Plan     1) Breast Health - Clinical breast exam & mammogram yearly, Self breast awareness. Schedule screening mammogram.   2) Pap - collected  3) STD-no  4) Smoking status- former smoker  5) Colon health - screening colonoscopy recommended if not up to date  6)  Overweight by BMI 29.68  7) Bone health - Weight bearing exercise, dietary calcium recommendations and vitamin D  reviewed.   8) Encouraged 150 minutes of exercise per week if not medically contraindicated  9) Lichen sclerosus: Hypopigmentation of bilateral labia majora and perineum. Linear fissure at  perineum. Topical clobetasol BID X7 days in sparing amounts-discussed risks of thinning skin with over use. Advised topical A&D ointment or Aquaphor as a barrier. Pat dry, avoid wiping. Keep skin clean and dry. Recommend cotton only underwear. Avoid soap in genital area  Recommend examination every 6-12 months with concerns for lichen sclerosus. Discussed hypoallergenic laundry detergent. Warm tub soaks PRN discomfort.     Return in about 1 year (around 7/3/2026) for Annual physical.    Jayna Acosta, APRN  7/3/2025  13:18 EDT

## 2025-07-08 LAB
CYTOLOGIST CVX/VAG CYTO: NORMAL
CYTOLOGY CVX/VAG DOC CYTO: NORMAL
CYTOLOGY CVX/VAG DOC THIN PREP: NORMAL
DX ICD CODE: NORMAL
HPV I/H RISK 4 DNA CVX QL PROBE+SIG AMP: NEGATIVE
OTHER STN SPEC: NORMAL
SERVICE CMNT-IMP: NORMAL
STAT OF ADQ CVX/VAG CYTO-IMP: NORMAL

## 2025-08-11 RX ORDER — ALLOPURINOL 100 MG/1
100 TABLET ORAL DAILY
Qty: 90 TABLET | Refills: 1 | Status: SHIPPED | OUTPATIENT
Start: 2025-08-11